# Patient Record
Sex: MALE | Race: WHITE | NOT HISPANIC OR LATINO | URBAN - NONMETROPOLITAN AREA
[De-identification: names, ages, dates, MRNs, and addresses within clinical notes are randomized per-mention and may not be internally consistent; named-entity substitution may affect disease eponyms.]

---

## 2024-02-05 ENCOUNTER — TELEMEDICINE (OUTPATIENT)
Dept: PRIMARY CARE | Facility: CLINIC | Age: 44
End: 2024-02-05
Payer: COMMERCIAL

## 2024-02-05 DIAGNOSIS — J40 BRONCHITIS: ICD-10-CM

## 2024-02-05 DIAGNOSIS — J01.00 ACUTE NON-RECURRENT MAXILLARY SINUSITIS: Primary | ICD-10-CM

## 2024-02-05 PROBLEM — K64.8 BLEEDING INTERNAL HEMORRHOIDS: Status: RESOLVED | Noted: 2024-02-05 | Resolved: 2024-02-05

## 2024-02-05 PROBLEM — F41.9 ANXIETY: Status: ACTIVE | Noted: 2018-09-04

## 2024-02-05 PROCEDURE — 99213 OFFICE O/P EST LOW 20 MIN: CPT | Performed by: PHYSICIAN ASSISTANT

## 2024-02-05 RX ORDER — AZITHROMYCIN 250 MG/1
TABLET, FILM COATED ORAL
Qty: 6 TABLET | Refills: 0 | Status: SHIPPED | OUTPATIENT
Start: 2024-02-05 | End: 2024-02-10

## 2024-02-05 RX ORDER — GUAIFENESIN 600 MG/1
600 TABLET, EXTENDED RELEASE ORAL 2 TIMES DAILY
Qty: 28 TABLET | Refills: 0 | Status: SHIPPED | OUTPATIENT
Start: 2024-02-05 | End: 2024-02-19

## 2024-02-05 RX ORDER — EPINEPHRINE 0.3 MG/.3ML
INJECTION SUBCUTANEOUS
COMMUNITY
Start: 2024-02-01

## 2024-02-05 RX ORDER — PROMETHAZINE HYDROCHLORIDE AND DEXTROMETHORPHAN HYDROBROMIDE 6.25; 15 MG/5ML; MG/5ML
5 SYRUP ORAL 4 TIMES DAILY PRN
Qty: 120 ML | Refills: 0 | Status: SHIPPED | OUTPATIENT
Start: 2024-02-05 | End: 2024-03-06

## 2024-02-05 RX ORDER — ALBUTEROL SULFATE 90 UG/1
2 AEROSOL, METERED RESPIRATORY (INHALATION) EVERY 4 HOURS PRN
Qty: 8 G | Refills: 0 | Status: SHIPPED | OUTPATIENT
Start: 2024-02-05 | End: 2025-02-04

## 2024-02-05 NOTE — PROGRESS NOTES
An interactive audio and video telecommunication system which permits real time communications between the patient (at the originating site) and provider (at the distant site) was utilized to provide this telehealth service.   Verbal consent was requested and obtained from Villa Cornelius on this date, 02/05/24 for a telehealth visit.     Subjective    Villa Cornelius is a 43 y.o. year old male patient presenting for virtual visit   Chief Complaint   Patient presents with    Cough      5 days of chills sweats, headaches, diarrhea, nasal congestion and rhinorrhea, and cough. Malaise, fatigue.  Both of his daughters were sick last week with similar symptoms.   Villa tested negative for covid at home.  Pressure around Left eye. Sinusitis concerns  Taking tylenol and ibuprofen. Some mucinex use.   Recurrent episodes of post tussive emesis  Hx allergic rhinitis. Daughters use inhalers. He had eczema as a child. I strongly recommend an inhaler    Patient Active Problem List   Diagnosis    Anxiety       Past Medical History:   Diagnosis Date    Bleeding internal hemorrhoids 02/05/2024      No past surgical history on file.   No family history on file.   Social History     Tobacco Use    Smoking status: Not on file    Smokeless tobacco: Not on file   Substance Use Topics    Alcohol use: Not on file        Current Outpatient Medications:     EPINEPHrine 0.3 mg/0.3 mL injection syringe, Use as directed., Disp: , Rfl:     albuterol (Ventolin HFA) 90 mcg/actuation inhaler, Inhale 2 puffs every 4 hours if needed for wheezing or shortness of breath., Disp: 8 g, Rfl: 0    azithromycin (Zithromax) 250 mg tablet, Take 2 tablets (500 mg) by mouth once daily for 1 day, THEN 1 tablet (250 mg) once daily for 4 days. Take 2 tabs (500 mg) by mouth today, than 1 daily for 4 days.., Disp: 6 tablet, Rfl: 0    guaiFENesin (Mucinex) 600 mg 12 hr tablet, Take 1 tablet (600 mg) by mouth 2 times a day for 14 days. Do not crush, chew, or split.,  Disp: 28 tablet, Rfl: 0    promethazine-DM (Phenergan-DM) 6.25-15 mg/5 mL syrup, Take 5 mL by mouth 4 times a day as needed for cough., Disp: 120 mL, Rfl: 0     Review of Systems    Review of Systems:   Constitutional: Denies fever  HEENT: Denies ST, earache  CVS: Denies Chest pain  Pulmonary: Denies wheezing, SOB  GI: Denies N/V  : Denies dysuria  Musculoskeletal:  Denies myalgia  Neuro: Denies focal weakness or numbness.  Skin: Denies Rashes.  *Review of Systems is negative unless otherwise mentioned in HPI or ROS above.     Objective    VITALS  Pt does not have vitals available at time of visit.    Exam       Limited physical exam in virtual platform  Nontoxic. No acute distress.  Nonlabored breathing.    Assessment/Plan      Problem List Items Addressed This Visit    None  Visit Diagnoses       Acute non-recurrent maxillary sinusitis    -  Primary    Relevant Medications    azithromycin (Zithromax) 250 mg tablet    promethazine-DM (Phenergan-DM) 6.25-15 mg/5 mL syrup    Bronchitis        Relevant Medications    albuterol (Ventolin HFA) 90 mcg/actuation inhaler    guaiFENesin (Mucinex) 600 mg 12 hr tablet                   DISCHARGE    Please schedule a follow up visit     Any prescriptions were sent to the pharmacy, please make sure to pick them up and call if there are any issues or questions.     Thank you for trusting me to be a part of your healthcare.    Take care!     Clarice Martinez PA-C  [facility]

## 2024-03-29 ENCOUNTER — TELEPHONE (OUTPATIENT)
Dept: PRIMARY CARE | Facility: CLINIC | Age: 44
End: 2024-03-29
Payer: COMMERCIAL

## 2024-03-29 NOTE — TELEPHONE ENCOUNTER
I spoke to the patient and I discussed the EKG with him.  I will make a copy so that he can pick it up at the .  
PT IS LOOKING AT HIS MYCHART AND IT SHOWS AN EKG FROM 8/23 THAT SAYS ITS ABNORMAL . CAN SOMEONE PLEASE REACH OUT TO HIM REGARDING THIS , HE WAS NOT MADE AWARE THET THIS WAS ABNORMAL. [phone].   
yes

## 2024-05-27 DIAGNOSIS — B96.89 BACTERIAL URI: Primary | ICD-10-CM

## 2024-05-27 DIAGNOSIS — J06.9 BACTERIAL URI: Primary | ICD-10-CM

## 2024-05-27 RX ORDER — AZITHROMYCIN 250 MG/1
TABLET, FILM COATED ORAL DAILY
Qty: 6 TABLET | Refills: 0 | Status: SHIPPED | OUTPATIENT
Start: 2024-05-27 | End: 2024-06-01